# Patient Record
Sex: MALE
[De-identification: names, ages, dates, MRNs, and addresses within clinical notes are randomized per-mention and may not be internally consistent; named-entity substitution may affect disease eponyms.]

---

## 2022-05-11 PROBLEM — Z00.00 ENCOUNTER FOR PREVENTIVE HEALTH EXAMINATION: Status: ACTIVE | Noted: 2022-05-11

## 2022-05-16 ENCOUNTER — APPOINTMENT (OUTPATIENT)
Dept: SURGERY | Facility: CLINIC | Age: 62
End: 2022-05-16
Payer: COMMERCIAL

## 2022-05-16 VITALS
DIASTOLIC BLOOD PRESSURE: 81 MMHG | WEIGHT: 140 LBS | HEART RATE: 93 BPM | SYSTOLIC BLOOD PRESSURE: 136 MMHG | BODY MASS INDEX: 23.9 KG/M2 | HEIGHT: 64 IN

## 2022-05-16 VITALS — TEMPERATURE: 98 F

## 2022-05-16 DIAGNOSIS — R19.5 OTHER FECAL ABNORMALITIES: ICD-10-CM

## 2022-05-16 DIAGNOSIS — Z78.9 OTHER SPECIFIED HEALTH STATUS: ICD-10-CM

## 2022-05-16 DIAGNOSIS — Z80.41 FAMILY HISTORY OF MALIGNANT NEOPLASM OF OVARY: ICD-10-CM

## 2022-05-16 DIAGNOSIS — K64.9 UNSPECIFIED HEMORRHOIDS: ICD-10-CM

## 2022-05-16 PROCEDURE — 99203 OFFICE O/P NEW LOW 30 MIN: CPT | Mod: 25

## 2022-05-16 PROCEDURE — 45300 PROCTOSIGMOIDOSCOPY DX: CPT

## 2022-05-16 RX ORDER — CHOLECALCIFEROL (VITAMIN D3) 125 MCG
TABLET ORAL
Refills: 0 | Status: ACTIVE | COMMUNITY

## 2022-05-16 NOTE — CONSULT LETTER
[Dear  ___] : Dear  [unfilled], [Consult Letter:] : I had the pleasure of evaluating your patient, [unfilled]. [Consult Closing:] : Thank you very much for allowing me to participate in the care of this patient.  If you have any questions, please do not hesitate to contact me. [Sincerely,] : Sincerely, [FreeTextEntry3] : Pankaj Vega MD\par

## 2022-05-16 NOTE — ASSESSMENT
[FreeTextEntry1] : Mr. BENOIT  is presenting today for an evaluation. Patient is doing well and without reported complaints. He had a recent positive guaiac test. Denies rectal bleeding. BM's are normal. \par

## 2022-05-16 NOTE — REVIEW OF SYSTEMS
[Fever] : no fever [Chills] : no chills [Feeling Poorly] : not feeling poorly [Chest Pain] : no chest pain [Lower Ext Edema] : no extremity edema [Shortness Of Breath] : no shortness of breath [Cough] : no cough [Abdominal Pain] : no abdominal pain [Constipation] : no constipation [Diarrhea] : no diarrhea [Dysuria] : no dysuria [Hesitancy] : no urinary hesitancy [Arthralgias] : no arthralgias [Skin Lesions] : no skin lesions [Skin Wound] : no skin wound [Dizziness] : no dizziness [Anxiety] : no anxiety [Muscle Weakness] : no muscle weakness [Swollen Glands] : no swollen glands

## 2022-05-16 NOTE — PHYSICAL EXAM
[No Rash or Lesion] : No rash or lesion [Alert] : alert [Oriented to Person] : oriented to person [Oriented to Place] : oriented to place [Oriented to Time] : oriented to time [Calm] : calm [de-identified] : A/Ox3, NAD. appears comfortable  [de-identified] : EOMI, sclera anicteric  [de-identified] : no cervical lymphadenopathy  [de-identified] : airway patent / no use of accessory muscles  [de-identified] : normal HR [de-identified] : Abd is soft, nondistended, no rebound or guarding. No abdominal masses. No abdominal tenderness. [de-identified] : normal sphincter tone, no masses felt. no tenderness. no bleeding /external hemorrhoids [de-identified] : +ROM, normal gait

## 2022-05-16 NOTE — PLAN
[FreeTextEntry1] : Rigid sigmoidoscopy\par Patient was placed in left lateral position. After a digital rectal exam, the sigmoidoscope was inserted gently.\par Scope was passed to 20  cm. \par Findings: no masses or polyps seen. no bleeding. no inflammation; grade II hemorrhoids /external hemorrhoids \par \par findings were discussed w the patient in detail \par surgical intervention not indicated at this time \par \par patient will follow up if needed. \par \par Patient's questions and concerns addressed to their satisfaction, and patient verbalized an understanding of the information discussed.\par \par

## 2022-05-16 NOTE — HISTORY OF PRESENT ILLNESS
[de-identified] : KAMRON BENOIT is a 62 year old M who is referred to the office for consultation visit, he presents w the cc of having a positive guaiac test, 1 week ago. Patient states he has annual testing. Last colonoscopy was done with Dr. Eng, 03/2021, c/w hemorrhoids. He denies abdominal pain. No rectal bleeding. BM's are normal. Denies pain to rectal area.